# Patient Record
Sex: MALE | Race: WHITE | Employment: STUDENT | URBAN - NONMETROPOLITAN AREA
[De-identification: names, ages, dates, MRNs, and addresses within clinical notes are randomized per-mention and may not be internally consistent; named-entity substitution may affect disease eponyms.]

---

## 2022-12-25 ENCOUNTER — HOSPITAL ENCOUNTER (EMERGENCY)
Age: 22
Discharge: HOME | End: 2022-12-25
Attending: EMERGENCY MEDICINE
Payer: COMMERCIAL

## 2022-12-25 VITALS
RESPIRATION RATE: 16 BRPM | HEART RATE: 65 BPM | DIASTOLIC BLOOD PRESSURE: 71 MMHG | OXYGEN SATURATION: 100 % | SYSTOLIC BLOOD PRESSURE: 141 MMHG | TEMPERATURE: 98.5 F

## 2022-12-25 DIAGNOSIS — S61.215A LACERATION OF LEFT RING FINGER WITHOUT FOREIGN BODY WITHOUT DAMAGE TO NAIL, INITIAL ENCOUNTER: Primary | ICD-10-CM

## 2022-12-25 PROCEDURE — 6370000000 HC RX 637 (ALT 250 FOR IP): Performed by: EMERGENCY MEDICINE

## 2022-12-25 PROCEDURE — 99283 EMERGENCY DEPT VISIT LOW MDM: CPT

## 2022-12-25 RX ORDER — IBUPROFEN 400 MG/1
800 TABLET ORAL ONCE
Status: COMPLETED | OUTPATIENT
Start: 2022-12-25 | End: 2022-12-25

## 2022-12-25 RX ORDER — IBUPROFEN 600 MG/1
600 TABLET ORAL 3 TIMES DAILY PRN
Qty: 20 TABLET | Refills: 0 | Status: SHIPPED | OUTPATIENT
Start: 2022-12-25

## 2022-12-25 RX ORDER — CEPHALEXIN 500 MG/1
500 CAPSULE ORAL 3 TIMES DAILY
Qty: 21 CAPSULE | Refills: 0 | Status: SHIPPED | OUTPATIENT
Start: 2022-12-25 | End: 2023-01-01

## 2022-12-25 RX ORDER — CEPHALEXIN 250 MG/1
250 CAPSULE ORAL ONCE
Status: DISCONTINUED | OUTPATIENT
Start: 2022-12-25 | End: 2022-12-25 | Stop reason: HOSPADM

## 2022-12-25 RX ADMIN — IBUPROFEN 800 MG: 400 TABLET ORAL at 14:05

## 2022-12-25 NOTE — ED PROVIDER NOTES
eMERGENCY dEPARTMENT eNCOUnter        [facility]    Chief Complaint   Patient presents with    Laceration     Left hand happened yesterday. HPI 22-year-old male who was doing some woodcarving yesterday and got cut on the tip of the left little finger, and a small laceration on the ulnar aspect of the distal phalanx of the ring finger he applied the bandage on it and this happened about 4 PM yesterday. Denies any paresthesia tingling able to move his fingers well there is no active bleeding at present he is right-hand dominant. Last tetanus shot was 4 years ago      REVIEW OF SYSTEMS    General: No fever or chills  Cardiac: No chest pain  Pulmonary: No shortness of breath  GI: No vomiting or diarrhea  : No dysuria or hematuria  See [facility] for further details. All other systems reviewed and are negative. PAST MEDICAL & SURGICAL HISTORY    History reviewed. No pertinent past medical history. History reviewed. No pertinent surgical history. CURRENT MEDICATIONS        ALLERGIES    No Known Allergies    SOCIAL & FAMILY HISTORY    Social History     Socioeconomic History    Marital status: Single     Spouse name: None    Number of children: None    Years of education: None    Highest education level: None     History reviewed. No pertinent family history. PHYSICAL EXAM    VITAL SIGNS: BP (!) 141/71   Pulse 65   Temp 98.5 °F (36.9 °C) (Oral)   Resp 16   SpO2 100%   Constitutional:  Well developed, well nourished, no acute distress, non-toxic appearance   HENT:  Atraumatic, external ears normal, nose normal.  NECK- normal range of motion,  supple   Respiratory:  No respiratory distress,    Cardiovascular:  Normal rate,    Vascular: Intact  Musculoskeletal:      Examination of the left little finger there is a small laceration and at the distal tip without involvement of the nail and loss of skin tissue no active bleeding no wound infection at present.   All movements are intact and little finger. Neurovascular status intact  Left ring finger there is a laceration approximately 1 cm on the ulnar aspect of the distal phalanx extending from the tip of the finger there is no active bleeding no wound infection no visible or palpable foreign body movements of the ring finger intact neurovascular status intact    Integument:  Well hydrated, no rash   Neurologic:  Awake alert, no slurred speech   Psychiatric: Cooperative, pleasant affect    RADIOLOGY/PROCEDURES    none    ED COURSE & MEDICAL DECISION MAKING    Pertinent Labs & Imaging studies reviewed and interpreted. (See chart for details)    Vitals:    12/25/22 1244   BP: (!) 141/71   Pulse: 65   Resp: 16   Temp: 98.5 °F (36.9 °C)   TempSrc: Oral   SpO2: 100%       Wound is about 25 hour old wound and the  little finger has loss of tissue no stitches needed or indicated  Ring finger  old wound, no stitching indicated   . Discharged home with a prescription for Keflex and Motrin follow-up with her primary physician wound care instructions are given in the emergency department will place Tubex gauze dressing if we have and protective aluminum foam splint, otherwise Band-Aids and a protective finger splint will be given    FINAL IMPRESSION    1.  Laceration of left ring finger without foreign body without damage to nail, initial encounter          prescription for  Motrin and Keflex      (Please note that this note was completed with a voice recognition program.  Every attempt was made to edit the dictations, but inevitably there remain words that are mis-transcribed.)        Lucy Carroll MD  12/25/22 1196